# Patient Record
Sex: FEMALE | Race: ASIAN | NOT HISPANIC OR LATINO
[De-identification: names, ages, dates, MRNs, and addresses within clinical notes are randomized per-mention and may not be internally consistent; named-entity substitution may affect disease eponyms.]

---

## 2017-12-18 ENCOUNTER — RESULT REVIEW (OUTPATIENT)
Age: 47
End: 2017-12-18

## 2018-12-19 ENCOUNTER — RESULT REVIEW (OUTPATIENT)
Age: 48
End: 2018-12-19

## 2020-01-31 ENCOUNTER — RESULT REVIEW (OUTPATIENT)
Age: 50
End: 2020-01-31

## 2020-03-11 PROBLEM — Z00.00 ENCOUNTER FOR PREVENTIVE HEALTH EXAMINATION: Status: ACTIVE | Noted: 2020-03-11

## 2020-03-18 ENCOUNTER — APPOINTMENT (OUTPATIENT)
Dept: ORTHOPEDIC SURGERY | Facility: CLINIC | Age: 50
End: 2020-03-18

## 2020-06-02 ENCOUNTER — APPOINTMENT (OUTPATIENT)
Dept: ORTHOPEDIC SURGERY | Facility: CLINIC | Age: 50
End: 2020-06-02

## 2020-06-09 ENCOUNTER — APPOINTMENT (OUTPATIENT)
Dept: ORTHOPEDIC SURGERY | Facility: CLINIC | Age: 50
End: 2020-06-09
Payer: COMMERCIAL

## 2020-06-09 VITALS
BODY MASS INDEX: 28.47 KG/M2 | DIASTOLIC BLOOD PRESSURE: 97 MMHG | HEIGHT: 60 IN | TEMPERATURE: 98.4 F | SYSTOLIC BLOOD PRESSURE: 143 MMHG | WEIGHT: 145 LBS | OXYGEN SATURATION: 95 % | HEART RATE: 86 BPM

## 2020-06-09 DIAGNOSIS — M22.2X2 PATELLOFEMORAL DISORDERS, RIGHT KNEE: ICD-10-CM

## 2020-06-09 DIAGNOSIS — M22.2X1 PATELLOFEMORAL DISORDERS, RIGHT KNEE: ICD-10-CM

## 2020-06-09 DIAGNOSIS — M17.0 BILATERAL PRIMARY OSTEOARTHRITIS OF KNEE: ICD-10-CM

## 2020-06-09 PROCEDURE — 20611 DRAIN/INJ JOINT/BURSA W/US: CPT | Mod: LT

## 2020-06-09 PROCEDURE — 73564 X-RAY EXAM KNEE 4 OR MORE: CPT | Mod: RT

## 2020-06-09 PROCEDURE — 99204 OFFICE O/P NEW MOD 45 MIN: CPT | Mod: 25

## 2020-06-09 NOTE — PHYSICAL EXAM
[de-identified] : Physical Examination\par General: well nourished, in no acute distress, alert and oriented to person, place and time\par Psychiatric: normal mood and affect, no abnormal movements or speech patterns\par Eyes: vision intact - glasses\par Throat: no thyromegaly\par Lymph: no enlarged nodes, no lymphedema in extremity\par Respiratory: no wheezing, no shortness of breath with ambulation\par Cardiac: no cardiac leg swelling, 2+ peripheral pulses\par Neurology: normal gross sensation in extremities to light touch\par Abdomen: soft, non-tender, tympanic, no masses\par \par \par Limited right knee exam secondary to patient apprehension\par \par Musculoskeletal Examination\par Ambulation	+ antalgic gait, - assistive devices\par \par Knee			Right			Left\par General\par      Swelling/Deformity	normal			normal	\par      Skin			normal			normal\par      Erythema		-			-\par      Standing Alignment	neutral			neutral\par      Effusion		trace			trace\par Range of Motion\par      Hip			full painless ROM		full painless ROM\par      Knee Flexion		120			120\par      Knee Extension	0			0\par Patella\par      J Sign		-			-\par      Quad Medial/Lateral	1/1 1/1\par      Apprehension		-			-\par      Norman's		+			+\par      Grind Sign		+			+\par      Crepitus		+			+\par Palpation\par      Medial Joint Line	=			=\par      Medial Fem Condyle	-			-\par      Lateral Joint Line	-			-\par      Quad Tendon		-			-\par      Patella Tendon	-			-\par      Medial Patella		-			-\par      Lateral Patella 	-			-\par      Posterior Knee	+			+\par Ligamentous\par      Varus @ 0° / 30°	-/-			-/-\par      Valgus @ 0° / 30°	-/-			-/-\par      Lachman		-			-\par      Pivot Shift		-			-\par      Anterior Drawer	-			-\par      Posterior Drawer	-			-\par Meniscus\par      Hanna		=			-\par      Flexion Pinch		= post knee			-\par Strength Examination/Atrophy\par      Hip Flexors 		5+			5+\par      Quadriceps		5+			5+\par      Hamstring		5+			5+\par      Tibialis Anterior	5+			5+\par      Achilles/Soleus	5+			5+\par Sensation\par      Deep Peroneal	normal			normal\par      Superficial Peroneal 	normal			normal\par      Sural  		normal			normal\par      Posterior Tibial 	normal			normal\par      Saphneous 		normal			normal\par Pulses\par      DP			2+			2+\par  [de-identified] : 5 views of the affected bilateral knee (standing AP, flexing standing AP, 30degree flexed lateral, 0degree lateral, sunrise view)\par were ordered, obtained and evaluated by myself today and\par demonstrate:\par \par RIGHT\par There is trace flex knee medial asymmetric narrowing\par Trace osteophytic lipping\par Small suprapatellar effusion\par Trace patellofemoral joint space loss without evidence of tilt [or] subluxation on sunrise view\par Normal soft tissue density\par Otherwise normal osseous bone structure without fracture or dislocation\par \par LEFT\par There is trace flexed knee medial asymmetric narrowing\par Trace osteophytic lipping\par Trace suprapatellar effusion\par Trace patellofemoral joint space loss without evidence of tilt [or] subluxation on sunrise view\par Normal soft tissue density\par Otherwise normal osseous bone structure without fracture or dislocation

## 2020-06-09 NOTE — HISTORY OF PRESENT ILLNESS
[de-identified] : CC Bilateral knee\par \par HPI 48 yo female presents with gradual onset of over months of activity related pain in the entire Bilateral knee [without injury]. The pain is same, and rated a 5 out of 10, described as pain, [without radiation]. Nothing makes the pain better and walking makes the pain worse. The patient reports associated symptoms of swollen posterior knee. The patient - pain at night affecting sleep, and + similar pain previously.\par \par The patient has tried the following treatments:\par Activity modification	-\par Ice/Compression  	-\par Braces    		-\par Nsaids    		-\par Physical Therapy 	-\par Cortisone Injection	-\par Visco Injection		-\par Zilretta			-\par Arthroscopy		-\par \par Review of Systems is positive for the above musculoskeletal symptoms and is otherwise non-contributory for general, constitutional, psychiatric, neurologic, HEENT, cardiac, respiratory, gastrointestinal, reproductive, lymphatic, and dermatologic complaints.\par \par Consult by Dr Crump\par

## 2020-06-09 NOTE — DISCUSSION/SUMMARY
[de-identified] : Bilateral knee chondromalacia\par Right knee effusion\par Possible right knee internal derangement\par \par I discussed my findings on history, exam and radiology.\par \par I reviewed the anatomy and function of the periarticular muscles and tendons, patella, ligaments, menisci and cartilage of the knee using models, images and diagrams. Given the current findings for the patient, I recommend proceeding with non-operative management of the knee consisting of the following:\par \par Patient education about the knee motions causing pain and possibly injury for activity modification\par \par Avoid deep knee bends and squating to prevent exacerbating knee injury\par \par Ice or warm compress\par \par Physical therapy prescription with VMO/Hip Abductor/Lumbar Core strengthening, ROM stretching, mobilization, modalities, HEP\par \par The patient was prescribed Diclofenac PO non-steroidal anti-inflammatory medication. 50mg tablets twice daily to be taken for at least 1-2 weeks in a row and then PRN afterwards. Risks and benefits were discussed and include but not limited to renal damage and GI ulceration and bleeding.  They were advised to take with food to limit stomach upset as well as warned to stop the medication if worsening gastric pain or dizziness or other side effects. Also to immediately stop the medication and seek appropriate medical attention if any severe stomach ache, gastritis, black/red vomit, black/red stools or any other medical concern.\par \par Patient is not amenable to any surgical management\par We'll continue with maximal medical management\par \par Procedure Note:\par \par Verbal consent was obtained for an arthrocentesis of the RIGHT knee, after the risks and benefits were discussed with the patient. Potential adverse effects were discussed including but not limited to bleeding, skin/joint infection, local skin reactions including bleaching, bruising, stiffness, soreness, vasovagal episodes, transient hyperglycemia, avascular necrosis, pseudo-septic type reactions, post injection joint pain, allergic reaction to product or anesthetic and other rare but potential adverse effects along with benefits including decreased pain and improved stability prior to obtaining verbal informed consent. It was also discussed that for some patients the treatment is ineffective and there are no guarantees that the patient will experience improvement as the result of the injection. In rare occasions the injection can cause worsening of pain.\par \par The use of a Sonosite 15-6 MHz linear transducer with live ultrasound guidance of the knee was necessary given the patient's BMI and local body habitus overlying and obscuring the accurate identification of normal body bony anatomy used to identify the injection site and the depth of soft tissue envelope necessitating a longer than normal needle to reach the joint space, and to confirm the location of the needle tip and intra-articular delivery of the medication. Without the use of live ultrasound guidance the injection would have been more difficult and place the patient's neurovascular structures at risk from the longer needle needed to traverse the soft tissue envelope.\par \par A 6 inch bolster was placed underneath the knee to place the RIGHT knee in a slightly flexed position. The superolateral injection site was identified using the ultrasound probe to identify the suprapatellar space and superior boarder of the patella first longitudinally and then transversely. The injection site was marked and prepped with a ChloraPrep swab and anesthetized with ethylchloride skin anesthesia. Using sterile technique a 18g 3 1/2 in spinal needle was passed through the injection site towards the suprapatellar space under live ultrasound guidance and noted to penetrate the joint capsule. 15 cc clear straw colored fluid was aspirated under live ultrasound visualization with resolution of joint effusion. 3 cc total of 1.5cc 1% lidocaine without epinephrine, 1.5cc 0.25% Marcaine without epinephrine was injected without resistance under live ultrasound visualization and noted to flow into the suprapatellar joint space. The injection site was sterilely dressed, there was minimal blood loss. The patient tolerated this procedure without any complications done by myself. Images were recorded and saved.\par \par Procedure Note:\par \par Verbal consent was obtained for an arthrocentesis and intra-articular corticosteroid injection of the LEFT knee, after the risks and benefits were discussed with the patient. Potential adverse effects were discussed including but not limited to bleeding, skin/joint infection, local skin reactions including bleaching, bruising, stiffness, soreness, vasovagal episodes, transient hyperglycemia, avascular necrosis, pseudo-septic type reactions, post injection joint pain, allergic reaction to product or anesthetic and other rare but potential adverse effects along with benefits including decreased pain and improved stability prior to obtaining verbal informed consent. It was also discussed that for some patients the treatment is ineffective and there are no guarantees that the patient will experience improvement as the result of the injection. In rare occasions the injection can cause worsening of pain.\par \par The use of a Sonosite 15-6 MHz linear transducer with live ultrasound guidance of the knee was necessary given the patient's BMI and local body habitus overlying and obscuring the accurate identification of normal body bony anatomy used to identify the injection site and the depth of soft tissue envelope necessitating a longer than normal needle to reach the joint space, and to confirm the location of the needle tip and intra-articular delivery of the medication. Without the use of live ultrasound guidance the injection would have been more difficult and place the patient's neurovascular structures at risk from the longer needle needed to traverse the soft tissue envelope.\par \par A 6 inch bolster was placed underneath the knee to place the knee in a slightly flexed position. The superolateral injection site was identified using the ultrasound probe to identify the suprapatellar space and superior boarder of the patella first longitudinally and then transversely. The injection site was marked and prepped with a ChloraPrep swab and anesthetized with ethylchloride skin anesthesia. Using sterile technique a 20g 3 1/2 in spinal needle with 3 cc total of 1cc 1% lidocaine without epinephrine, 1cc 0.25% Marcaine without epinephrine and 1cc of 40mg/mL Kenalog was passed through the injection site towards the suprapatellar space under live ultrasound guidance and noted to penetrate the joint capsule. The medication was injected without resistance under live ultrasound visualization and noted to flow into the suprapatellar joint space. The injection site was sterilely dressed, there was minimal blood loss. The patient tolerated this procedure without any complications done by myself. Images were recorded and saved.\par \par The patient has been advised that if they notice any worsening of symptoms or any problems to contact me and seek care from a qualified medical professional. The patient was instructed to ice the knee and take NSAID medication on an as needed basis if the patient feels discomfort.\par \par The patient has been advised that if they notice any worsening of symptoms or any problems to contact me and seek care from a qualified medical professional. The patient was instructed to ice the knee and take NSAID medication on an as needed basis if the patient feels discomfort.\par \par \par \par \par \par The patient verifies their understanding the the visit, diagnosis and plan. They agree with the treatment plan and will contact the office with any questions or problems.\par \par FU after PT completion if unimproved\par

## 2020-06-14 ENCOUNTER — EMERGENCY (EMERGENCY)
Facility: HOSPITAL | Age: 50
LOS: 1 days | Discharge: HOME | End: 2020-06-14
Attending: EMERGENCY MEDICINE | Admitting: EMERGENCY MEDICINE
Payer: COMMERCIAL

## 2020-06-14 VITALS
HEART RATE: 68 BPM | SYSTOLIC BLOOD PRESSURE: 120 MMHG | DIASTOLIC BLOOD PRESSURE: 68 MMHG | OXYGEN SATURATION: 100 % | RESPIRATION RATE: 16 BRPM | TEMPERATURE: 98 F

## 2020-06-14 VITALS
SYSTOLIC BLOOD PRESSURE: 163 MMHG | RESPIRATION RATE: 18 BRPM | OXYGEN SATURATION: 100 % | DIASTOLIC BLOOD PRESSURE: 89 MMHG | HEART RATE: 74 BPM | TEMPERATURE: 98 F

## 2020-06-14 LAB
ALBUMIN SERPL ELPH-MCNC: 4.6 G/DL — SIGNIFICANT CHANGE UP (ref 3.3–5)
ALP SERPL-CCNC: 95 U/L — SIGNIFICANT CHANGE UP (ref 40–120)
ALT FLD-CCNC: 8 U/L — SIGNIFICANT CHANGE UP (ref 4–33)
ANION GAP SERPL CALC-SCNC: 14 MMO/L — SIGNIFICANT CHANGE UP (ref 7–14)
APPEARANCE UR: CLEAR — SIGNIFICANT CHANGE UP
APTT BLD: 27.1 SEC — LOW (ref 27.5–36.3)
AST SERPL-CCNC: 15 U/L — SIGNIFICANT CHANGE UP (ref 4–32)
BACTERIA # UR AUTO: NEGATIVE — SIGNIFICANT CHANGE UP
BASOPHILS # BLD AUTO: 0.04 K/UL — SIGNIFICANT CHANGE UP (ref 0–0.2)
BASOPHILS NFR BLD AUTO: 0.3 % — SIGNIFICANT CHANGE UP (ref 0–2)
BILIRUB SERPL-MCNC: 0.3 MG/DL — SIGNIFICANT CHANGE UP (ref 0.2–1.2)
BILIRUB UR-MCNC: NEGATIVE — SIGNIFICANT CHANGE UP
BLD GP AB SCN SERPL QL: NEGATIVE — SIGNIFICANT CHANGE UP
BLOOD UR QL VISUAL: HIGH
BUN SERPL-MCNC: 26 MG/DL — HIGH (ref 7–23)
CALCIUM SERPL-MCNC: 9.6 MG/DL — SIGNIFICANT CHANGE UP (ref 8.4–10.5)
CHLORIDE SERPL-SCNC: 100 MMOL/L — SIGNIFICANT CHANGE UP (ref 98–107)
CO2 SERPL-SCNC: 28 MMOL/L — SIGNIFICANT CHANGE UP (ref 22–31)
COLOR SPEC: SIGNIFICANT CHANGE UP
CREAT SERPL-MCNC: 0.96 MG/DL — SIGNIFICANT CHANGE UP (ref 0.5–1.3)
EOSINOPHIL # BLD AUTO: 0.08 K/UL — SIGNIFICANT CHANGE UP (ref 0–0.5)
EOSINOPHIL NFR BLD AUTO: 0.6 % — SIGNIFICANT CHANGE UP (ref 0–6)
GLUCOSE SERPL-MCNC: 108 MG/DL — HIGH (ref 70–99)
GLUCOSE UR-MCNC: NEGATIVE — SIGNIFICANT CHANGE UP
HCT VFR BLD CALC: 42.3 % — SIGNIFICANT CHANGE UP (ref 34.5–45)
HGB BLD-MCNC: 13.3 G/DL — SIGNIFICANT CHANGE UP (ref 11.5–15.5)
HYALINE CASTS # UR AUTO: NEGATIVE — SIGNIFICANT CHANGE UP
IMM GRANULOCYTES NFR BLD AUTO: 0.6 % — SIGNIFICANT CHANGE UP (ref 0–1.5)
INR BLD: 1.03 — SIGNIFICANT CHANGE UP (ref 0.88–1.17)
KETONES UR-MCNC: NEGATIVE — SIGNIFICANT CHANGE UP
LEUKOCYTE ESTERASE UR-ACNC: NEGATIVE — SIGNIFICANT CHANGE UP
LYMPHOCYTES # BLD AUTO: 1.86 K/UL — SIGNIFICANT CHANGE UP (ref 1–3.3)
LYMPHOCYTES # BLD AUTO: 12.9 % — LOW (ref 13–44)
MCHC RBC-ENTMCNC: 27.6 PG — SIGNIFICANT CHANGE UP (ref 27–34)
MCHC RBC-ENTMCNC: 31.4 % — LOW (ref 32–36)
MCV RBC AUTO: 87.8 FL — SIGNIFICANT CHANGE UP (ref 80–100)
MONOCYTES # BLD AUTO: 0.67 K/UL — SIGNIFICANT CHANGE UP (ref 0–0.9)
MONOCYTES NFR BLD AUTO: 4.7 % — SIGNIFICANT CHANGE UP (ref 2–14)
NEUTROPHILS # BLD AUTO: 11.67 K/UL — HIGH (ref 1.8–7.4)
NEUTROPHILS NFR BLD AUTO: 80.9 % — HIGH (ref 43–77)
NITRITE UR-MCNC: NEGATIVE — SIGNIFICANT CHANGE UP
NRBC # FLD: 0 K/UL — SIGNIFICANT CHANGE UP (ref 0–0)
PH UR: 6 — SIGNIFICANT CHANGE UP (ref 5–8)
PLATELET # BLD AUTO: 325 K/UL — SIGNIFICANT CHANGE UP (ref 150–400)
PMV BLD: 9.8 FL — SIGNIFICANT CHANGE UP (ref 7–13)
POTASSIUM SERPL-MCNC: 3.6 MMOL/L — SIGNIFICANT CHANGE UP (ref 3.5–5.3)
POTASSIUM SERPL-SCNC: 3.6 MMOL/L — SIGNIFICANT CHANGE UP (ref 3.5–5.3)
PROT SERPL-MCNC: 8.4 G/DL — HIGH (ref 6–8.3)
PROT UR-MCNC: 20 — SIGNIFICANT CHANGE UP
PROTHROM AB SERPL-ACNC: 11.7 SEC — SIGNIFICANT CHANGE UP (ref 9.8–13.1)
RBC # BLD: 4.82 M/UL — SIGNIFICANT CHANGE UP (ref 3.8–5.2)
RBC # FLD: 13.3 % — SIGNIFICANT CHANGE UP (ref 10.3–14.5)
RBC CASTS # UR COMP ASSIST: HIGH (ref 0–?)
RH IG SCN BLD-IMP: POSITIVE — SIGNIFICANT CHANGE UP
SODIUM SERPL-SCNC: 142 MMOL/L — SIGNIFICANT CHANGE UP (ref 135–145)
SP GR SPEC: 1.02 — SIGNIFICANT CHANGE UP (ref 1–1.04)
SQUAMOUS # UR AUTO: SIGNIFICANT CHANGE UP
UROBILINOGEN FLD QL: NORMAL — SIGNIFICANT CHANGE UP
WBC # BLD: 14.4 K/UL — HIGH (ref 3.8–10.5)
WBC # FLD AUTO: 14.4 K/UL — HIGH (ref 3.8–10.5)
WBC UR QL: SIGNIFICANT CHANGE UP (ref 0–?)

## 2020-06-14 PROCEDURE — 74176 CT ABD & PELVIS W/O CONTRAST: CPT | Mod: 26

## 2020-06-14 PROCEDURE — 99284 EMERGENCY DEPT VISIT MOD MDM: CPT

## 2020-06-14 NOTE — ED PROVIDER NOTE - PATIENT PORTAL LINK FT
You can access the FollowMyHealth Patient Portal offered by [facility] by registering at the following website: [email or web address]. By joining [facility]’s FollowMyHealth portal, you will also be able to view your health information using other applications (apps) compatible with our system.

## 2020-06-14 NOTE — ED PROVIDER NOTE - NSFOLLOWUPINSTRUCTIONS_ED_ALL_ED_FT
Please follow up with your primary care doctor in 1-2 days.  See referral attached for Urology and General Surgery referral.  If you have any new, worsened or concerning complaints, please return to the emergency department immediately.

## 2020-06-14 NOTE — ED PROVIDER NOTE - CLINICAL SUMMARY MEDICAL DECISION MAKING FREE TEXT BOX
Pt is a 48 y/o F PMHx HTN, borderline diabetes p/w abdominal pain today. -- possible renal colic, not clinically concerning for appendicitis, low suspicion for emergent pelvic pathology given normal pelvic exam at this time -- labs, CT abd and pelvis

## 2020-06-14 NOTE — ED PROVIDER NOTE - PROGRESS NOTE DETAILS
MARTHA Norris: pt endorsed from MARTHA Serra. Pt reassessed, states she feels better. Labs reviewed. UA shows no evidence of UTI. CT shows no acute intraabdominal findings - "Tiny non-obstructing intrarenal calculi. No hydronephrosis. Small fat-containing right inguinal hernia." On reassessment, abdomen soft nontender. Pt ate and tolerated oral intake well. Pt is clinically stable for discharge home. Urology follow up for intrarenal calculi and Gen Surg follow up for small right inguinal hernia. Return precautions. c/d/w Dr. Snell

## 2020-06-14 NOTE — ED ADULT TRIAGE NOTE - CHIEF COMPLAINT QUOTE
Patient has c/o right sided abdominal pain that started today. Pt had a surgery last Tuesday on both knees to drain fluid and was given diclofenac and she states she might be having side effects from the meds or something she ate.

## 2020-06-14 NOTE — ED PROVIDER NOTE - NONTENDER LOCATION
right costovertebral angle/right upper quadrant/right lower quadrant/left upper quadrant/left lower quadrant/umbilical/suprapubic/left costovertebral angle/periumbilical

## 2020-06-14 NOTE — ED ADULT NURSE NOTE - OBJECTIVE STATEMENT
Pt received to intake AAO x 3 and ambulatory c/o RLQ pain that began suddenly today at 4PM. Pt denies fever or dysuria and reports one episode of vomtiting. Labs sent and 18g placed to the left AC, pt awaiting CT scan and in NAD.

## 2020-06-14 NOTE — ED PROVIDER NOTE - ATTENDING CONTRIBUTION TO CARE
Attending Statement: I have reviewed and agree with all pertinent clinical information, including history and physical exam and agree with treatment plan of the PA, except as noted.  50yo F hx of HTN, borderline DM, with abdominal pain today. Described a pain on/off located on the right sided abdomen/flank non radiating. Associated with nausea and one episode of NBNB vomit. no diarrhea. no fever/chills. no dysuria no hematuria no frequency or urgency. no trauma.   Vital signs noted. mmm. nontoxic, no distress. No resp distress. able to speak in full and clear sentences. no wheeze, rales or stridor. soft obese abdomen, mild tenderness on the right midlateral aspect. no cvat. neg parisi pelvic dw w  MARTHA Serra.   plan labs, urine, ct [place], IVF, anti emetic, pain med, re assess

## 2020-06-14 NOTE — ED PROVIDER NOTE - OBJECTIVE STATEMENT
Pt is a 48 y/o F PMHx HTN, borderline diabetes p/w abdominal pain today.  Pt states at 4 PM pt developed sudden onset moderate nonradiating right sided abdominal pain associated with nausea and nonbloody vomiting.  Pt states pain began to improve but then had a recurrent episode of same pain while showering.  Presently pain is mild.  Pt notes she was concerned that pain due to recently starting Diclofenac for bilateral knee pain.  Pt denies any fevers, chills, chest pain, SOB, dysuria, cloudy urine, hematuria, vaginal bleeding, vaginal discharge, flank pain or any other specific complaints.  Pt also notes she is scheduled to have a pelvic sono tomorrow to evaluate for fibroids.

## 2020-06-15 LAB
CULTURE RESULTS: SIGNIFICANT CHANGE UP
SPECIMEN SOURCE: SIGNIFICANT CHANGE UP

## 2020-06-22 NOTE — ED POST DISCHARGE NOTE - RESULT SUMMARY
MARTHA Yanez: Pt called back requesting urine results, uc contaminated, pt w/ punctate renal stones janie. Recommended repeating UC w/ PMD.

## 2020-06-30 PROBLEM — I10 ESSENTIAL (PRIMARY) HYPERTENSION: Chronic | Status: ACTIVE | Noted: 2020-06-14

## 2020-06-30 PROBLEM — R73.03 PREDIABETES: Chronic | Status: ACTIVE | Noted: 2020-06-14

## 2020-07-21 ENCOUNTER — APPOINTMENT (OUTPATIENT)
Dept: UROLOGY | Facility: CLINIC | Age: 50
End: 2020-07-21
Payer: COMMERCIAL

## 2020-07-21 VITALS
SYSTOLIC BLOOD PRESSURE: 136 MMHG | RESPIRATION RATE: 16 BRPM | TEMPERATURE: 98.3 F | DIASTOLIC BLOOD PRESSURE: 89 MMHG | HEIGHT: 60 IN | BODY MASS INDEX: 28.47 KG/M2 | HEART RATE: 81 BPM | WEIGHT: 145 LBS

## 2020-07-21 VITALS — TEMPERATURE: 98.3 F

## 2020-07-21 DIAGNOSIS — Z78.9 OTHER SPECIFIED HEALTH STATUS: ICD-10-CM

## 2020-07-21 PROCEDURE — 99203 OFFICE O/P NEW LOW 30 MIN: CPT

## 2020-07-21 RX ORDER — DICLOFENAC SODIUM 50 MG/1
50 TABLET, DELAYED RELEASE ORAL
Qty: 60 | Refills: 1 | Status: COMPLETED | COMMUNITY
Start: 2020-06-09 | End: 2020-07-21

## 2020-07-21 NOTE — PHYSICAL EXAM
[General Appearance - Well Developed] : well developed [General Appearance - Well Nourished] : well nourished [Normal Appearance] : normal appearance [Well Groomed] : well groomed [General Appearance - In No Acute Distress] : no acute distress [Edema] : no peripheral edema [Respiration, Rhythm And Depth] : normal respiratory rhythm and effort [Exaggerated Use Of Accessory Muscles For Inspiration] : no accessory muscle use [Abdomen Tenderness] : non-tender [Abdomen Soft] : soft [Costovertebral Angle Tenderness] : no ~M costovertebral angle tenderness [Urinary Bladder Findings] : the bladder was normal on palpation [Normal Station and Gait] : the gait and station were normal for the patient's age [Skin Color & Pigmentation] : normal skin color and pigmentation [Skin Turgor] : supple [] : no rash [No Focal Deficits] : no focal deficits [Oriented To Time, Place, And Person] : oriented to person, place, and time [Affect] : the affect was normal [Mood] : the mood was normal [Not Anxious] : not anxious [No Palpable Adenopathy] : no palpable adenopathy

## 2020-07-22 LAB
APPEARANCE: CLEAR
BACTERIA: NEGATIVE
BILIRUBIN URINE: NEGATIVE
BLOOD URINE: NEGATIVE
COLOR: NORMAL
GLUCOSE QUALITATIVE U: NEGATIVE
HYALINE CASTS: 1 /LPF
KETONES URINE: NEGATIVE
LEUKOCYTE ESTERASE URINE: NEGATIVE
MICROSCOPIC-UA: NORMAL
NITRITE URINE: NEGATIVE
PH URINE: 7
PROTEIN URINE: NORMAL
RED BLOOD CELLS URINE: 2 /HPF
SPECIFIC GRAVITY URINE: 1.02
SQUAMOUS EPITHELIAL CELLS: 2 /HPF
UROBILINOGEN URINE: NORMAL
WHITE BLOOD CELLS URINE: 1 /HPF

## 2020-07-22 NOTE — HISTORY OF PRESENT ILLNESS
[FreeTextEntry1] : Patient is a 49 year old female. On June 14th patient had severe abdominal pain and vomiting, went to ED.  CT scan done shows tiny bilateral punctuate renal stones and right ovarian vein phlebolith noted just posterior to the proximal right ureter. \par Patient denies any gross hematuria. Denies any history of kidney stones. Denies any flank pain, denies any fever. Denies any dysuria.  She does report some urinary frequency since this episode has began.

## 2020-07-22 NOTE — LETTER BODY
[Dear  ___] : Dear  [unfilled], [FreeTextEntry1] : I had the pleasure of seeing your patient, LEON BECERRIL, in the office today.  \par \par Please see my office note below.\par \par Thank you for allowing me to participate in his care and please do not hesitate to contact me with any questions or concerns regarding her care.\par \par Sincerely,\par \par Troy Leija MD\par Chief of Urology, [facility]\par  of Urology\par [address] [place] [address]\[place] [address]\par PH:      [phone]\par Email:  [email or web address]\par \par

## 2020-07-22 NOTE — ASSESSMENT
[FreeTextEntry1] : Reviewed CT scan with patient. CT scan done shows tiny bilateral punctuate non-obstructing renal stones, no hydronephrosis  and right ovarian vein phlebolith noted just posterior to the proximal right ureter.\par Urinalysis sent today.   If blood noted in urine will follow up with CT Urogram . If no blood in urine will follow up with ultrasound of the kidney in 6 months. Will call patient with results and discuss plan.  Patient will also be following up with GYN this week

## 2020-07-22 NOTE — REVIEW OF SYSTEMS
[Abdominal Pain] : abdominal pain [Date of last menstrual period ____] : date of last menstrual period: [unfilled] [Wake up at night to urinate  How many times?  ___] : wakes up to urinate [unfilled] times during the night [Slow urine stream] : slow urine stream [Leakage of urine with straining, coughing, laughing] : leakage of urine with straining, coughing, laughing [Joint Pain] : joint pain [Joint Swelling] : joint swelling [Dizziness] : dizziness [Limb Swelling] : limb swelling [Muscle Weakness] : muscle weakness [Negative] : Endocrine [see HPI] : see HPI [FreeTextEntry2] : high blood pressure

## 2020-07-23 LAB — BACTERIA UR CULT: NORMAL

## 2021-02-16 ENCOUNTER — APPOINTMENT (OUTPATIENT)
Dept: UROLOGY | Facility: CLINIC | Age: 51
End: 2021-02-16

## 2021-02-18 ENCOUNTER — APPOINTMENT (OUTPATIENT)
Dept: UROLOGY | Facility: CLINIC | Age: 51
End: 2021-02-18

## 2021-04-15 ENCOUNTER — APPOINTMENT (OUTPATIENT)
Dept: UROLOGY | Facility: CLINIC | Age: 51
End: 2021-04-15
Payer: COMMERCIAL

## 2021-04-15 ENCOUNTER — OUTPATIENT (OUTPATIENT)
Dept: OUTPATIENT SERVICES | Facility: HOSPITAL | Age: 51
LOS: 1 days | End: 2021-04-15
Payer: COMMERCIAL

## 2021-04-15 DIAGNOSIS — R35.0 FREQUENCY OF MICTURITION: ICD-10-CM

## 2021-04-15 DIAGNOSIS — N20.0 CALCULUS OF KIDNEY: ICD-10-CM

## 2021-04-15 PROCEDURE — 99213 OFFICE O/P EST LOW 20 MIN: CPT | Mod: 25

## 2021-04-15 PROCEDURE — 99072 ADDL SUPL MATRL&STAF TM PHE: CPT

## 2021-04-15 PROCEDURE — 76775 US EXAM ABDO BACK WALL LIM: CPT

## 2021-04-15 PROCEDURE — 76775 US EXAM ABDO BACK WALL LIM: CPT | Mod: 26

## 2021-04-21 NOTE — HISTORY OF PRESENT ILLNESS
[FreeTextEntry1] : Here for follow-up.  Last visit in summer 2020 at which time she developed a obstructing stone causing flank pain.  She passed a stone spontaneously.  She returns today for an office renal ultrasound.\par \par Since her last visit no gross hematuria, dysuria.  No evidence of UTI.  She denies flank pain or other associated symptoms.  She has not passed any other stones since her last visit.\par \par Urinary function remains normal.

## 2021-04-21 NOTE — ASSESSMENT
[FreeTextEntry1] : Office renal stone performed today.  Images reviewed.  No remaining stone seen within the kidney.  No hydronephrosis or other abnormality.  Stable renal cyst.\par \par Discussed all prevention strategies including adequate fluid intake, low-sodium diet.\par \par No further follow-up necessary unless patient develops recurrent symptoms.

## 2021-05-03 ENCOUNTER — RESULT REVIEW (OUTPATIENT)
Age: 51
End: 2021-05-03

## 2021-08-16 ENCOUNTER — RESULT REVIEW (OUTPATIENT)
Age: 51
End: 2021-08-16

## 2021-12-02 ENCOUNTER — APPOINTMENT (OUTPATIENT)
Dept: SURGERY | Facility: CLINIC | Age: 51
End: 2021-12-02
Payer: COMMERCIAL

## 2021-12-02 VITALS
WEIGHT: 158 LBS | SYSTOLIC BLOOD PRESSURE: 154 MMHG | HEIGHT: 60 IN | DIASTOLIC BLOOD PRESSURE: 98 MMHG | BODY MASS INDEX: 31.02 KG/M2 | HEART RATE: 93 BPM

## 2021-12-02 VITALS — TEMPERATURE: 97.1 F

## 2021-12-02 PROCEDURE — 99203 OFFICE O/P NEW LOW 30 MIN: CPT

## 2021-12-02 NOTE — DATA REVIEWED
Class III - visualization of the soft palate and the base of the uvula [FreeTextEntry1] : Exam requested by:\par AISHWARYA BANGURA MD\par [address] [facility] [place]par [facility] [address]\[facility]\par SITE PHONE: [phone]\par Patient: LEON BECERRIL\par YOB: 1970\par Phone: [phone]\par MRN: [id number] Acc: [id number]\par Date of Exam: 10-\par  \par EXAM: DIGITAL BILATERAL SCREENING MAMMOGRAM WITH CAD AND TOMOSYNTHESIS\par \par HISTORY: The patient is 50 years old and is seen for a screening mammogram. Family history of breast cancer: None.\par \par CLINICAL BREAST EXAMINATION: The patient reports that her last clinical breast exam was within the past year. \par \par TECHNIQUE: The following views were obtained digitally: bilateral craniocaudal, bilateral mediolateral oblique. Low-dose full-field digital breast tomosynthesis examination was performed with tomosynthesis acquisitions and synthesized 2D reconstructed mammogram. Computer-aided detection (CAD) was utilized. \par \par COMPARISON: The present examination has been compared to prior breast imaging studies dating back to 2014.\par \par FINDINGS:\par BREAST COMPOSITION: The breasts are heterogeneously dense, which may obscure small masses.\par Left Breast:\par There is a focal asymmetry seen at the mid depth left lower inner quadrant approximately 7 cm from the nipple.\par No evidence of suspicious microcalcifications. There are benign-appearing calcifications and vascular calcifications present.\par Right Breast:\par No evidence of suspicious masses, microcalcifications, or architectural distortion.\par \par IMPRESSION:\par Left Breast: Focal asymmetry seen at the mid depth left lower inner quadrant approximately 7 cm from the nipple requires further evaluation with diagnostic mammogram and ultrasound.\par \par Right Breast: No mammographic evidence of malignancy. \par \par FOLLOW-UP: Additional imaging.\par Diagnostic left breast mammogram and ultrasound. \par \par ASSESSMENT: BI-RADS Category 0:  Incomplete. Need additional imaging evaluation.\par \par This examination should not preclude the clinical evaluation of a suspicious palpable abnormality. \par The false-negative rate of mammography is approximately 10%\par \par As per the FDA requirements, a layman's letter has been generated and sent to your patient stating the results and recommendations of this breast imaging study. We have entered your patient into our reminder system and will notify them when they are due for their next breast imaging exam. \par \par Thank you for the opportunity to participate in the care of this patient.  \par  \par KERRIE WALTON MD  - Electronically Signed: 10- 11:40 AM \par Physician to Physician Direct Line is: \par \par ______________________________________\par \par Exam requested by:\par AISHWARYA BANGURA MD\par [address] [facility]par [facility] [address]\par SITE PERFORMED: [facility]\par SITE PHONE: [phone]\par Patient: LEON BECERRIL\par YOB: 1970\par Phone: [phone]\par MRN: [id number] Acc: [id number]\par Date of Exam: 10-\par  \par EXAM: DIGITAL UNILATERAL LEFT DIAGNOSTIC CALLBACK MAMMOGRAM AND TOMOSYNTHESIS AND BREAST ULTRASOUND\par \par HISTORY: The patient is seen for evaluation of focal asymmetry left breast \par Age: 50 years old. \par \par COMPARISON: The present examination has been compared to prior breast imaging studies dating back to October 27, 2015\par \par MAMMOGRAM:\par \par TECHNIQUE: Full-field digital mammography of the left breast was obtained. Additional imaging is composed of spot compression view in CC and MLO projection Low-dose full-field digital breast tomosynthesis examination was performed with tomosynthesis acquisitions and synthesized 2D reconstructed mammogram. Computer-aided detection (CAD) was utilized. \par \par FINDINGS:\par BREAST COMPOSITION: The breasts are heterogeneously dense, which may obscure small masses. \par \par There is a 6 mm nodular asymmetry in the inner lower left breast.\par \par BREAST ULTRASOUND: \par \par TECHNIQUE: Targeted left breast ultrasound was performed.  \par \par FINDINGS: \par \par \par In the left breast at 7:00 7 cm from the nipple corresponding to the finding on mammography is a 7 x 4 mm hypoechoic nodule which suggests a complicated cyst.\par \par IMPRESSION:\par Persistent small nodular asymmetry inner lower left breast with corresponding 7 x 4 mm hypoechoic nodule at 7:00 clock left breast 7 cm from the nipple suggesting a complicated cyst  \par \par FOLLOW-UP: Follow-up imaging in 6 months. \par Short-term follow-up targeted left breast ultrasound recommended to confirm stability \par \par ASSESSMENT: BI-RADS Category 3:  Probably benign.\par \par This examination should not preclude the clinical evaluation of a suspicious palpable abnormality. \par \par As per the FDA requirements, a layman's letter has been generated and sent to your patient stating the results and recommendations of this breast imaging study. We have entered your patient into our reminder system and will notify them when they are due for their next breast imaging exam. \par \par Thank you for the opportunity to participate in the care of this patient.  \par  \par Anthony Sow MD  - Electronically Signed: 10- 1:24 PM \par Physician to Physician Direct Line is: [phone]\par

## 2021-12-02 NOTE — HISTORY OF PRESENT ILLNESS
[de-identified] : Bilateral Screening mammogram on 10/08/2021 that showed Left Breast: Focal asymmetry seen at the mid depth left lower inner quadrant approximately 7 cm from the nipple. Diagnostic mammo and sonogram of left breast  c/w Persistent small nodular asymmetry inner lower left breast with corresponding 7 x 4 mm hypoechoic nodule at 7:00 clock left breast 7 cm from the nipple suggesting a complicated cyst.  BI-RADS Category 3
Pt improved, atypical pain, bp improved, will dc to follow up with pmd and cardio. Precautions reviewed.

## 2021-12-02 NOTE — CONSULT LETTER
[Dear  ___] : Dear  [unfilled], [Consult Closing:] : Thank you very much for allowing me to participate in the care of this patient.  If you have any questions, please do not hesitate to contact me. [Sincerely,] : Sincerely, [FreeTextEntry3] : Sam Ballard MD\par

## 2021-12-02 NOTE — PLAN
[FreeTextEntry1] : Presently no surgical intervention needed\par Recommend mammo and breast US in 6 months

## 2022-04-06 ENCOUNTER — OUTPATIENT (OUTPATIENT)
Dept: OUTPATIENT SERVICES | Facility: HOSPITAL | Age: 52
LOS: 1 days | End: 2022-04-06
Payer: COMMERCIAL

## 2022-04-06 ENCOUNTER — APPOINTMENT (OUTPATIENT)
Dept: UROLOGY | Facility: CLINIC | Age: 52
End: 2022-04-06
Payer: COMMERCIAL

## 2022-04-06 DIAGNOSIS — R35.0 FREQUENCY OF MICTURITION: ICD-10-CM

## 2022-04-06 PROCEDURE — 76775 US EXAM ABDO BACK WALL LIM: CPT | Mod: 26

## 2022-04-06 PROCEDURE — 99213 OFFICE O/P EST LOW 20 MIN: CPT | Mod: 25

## 2022-04-06 PROCEDURE — 76775 US EXAM ABDO BACK WALL LIM: CPT

## 2022-04-06 NOTE — HISTORY OF PRESENT ILLNESS
[FreeTextEntry1] : Here for 1 year follow-up.  Has a previous history of ureteral stone causing obstruction.  Since her last visit, she denies gross hematuria, abdominal pain, flank pain.  No significant changes in her urinary function.  She has had no documented urinary tract infections.  Remainder of her medical history is otherwise unchanged.\par \par Today she underwent an office renal ultrasound.  The images were reviewed.  There is a small simple cyst.  There is no evidence of hydronephrosis and no evidence of residual stones within the kidney.

## 2022-04-06 NOTE — ASSESSMENT
[FreeTextEntry1] : I reviewed the ultrasound results with her.\par I again reviewed the dietary stone prevention strategies including fluid intake, low-sodium diet, low to moderate protein intake.\par No other intervention required at this time.  Follow-up as needed in the office.

## 2022-04-07 ENCOUNTER — TRANSCRIPTION ENCOUNTER (OUTPATIENT)
Age: 52
End: 2022-04-07

## 2022-04-08 DIAGNOSIS — N20.0 CALCULUS OF KIDNEY: ICD-10-CM

## 2022-04-18 ENCOUNTER — APPOINTMENT (OUTPATIENT)
Dept: SURGERY | Facility: CLINIC | Age: 52
End: 2022-04-18
Payer: COMMERCIAL

## 2022-04-18 VITALS
HEIGHT: 60 IN | SYSTOLIC BLOOD PRESSURE: 155 MMHG | DIASTOLIC BLOOD PRESSURE: 102 MMHG | WEIGHT: 155 LBS | HEART RATE: 79 BPM | BODY MASS INDEX: 30.43 KG/M2

## 2022-04-18 VITALS — TEMPERATURE: 97.1 F

## 2022-04-18 PROCEDURE — 99213 OFFICE O/P EST LOW 20 MIN: CPT

## 2022-04-18 NOTE — PLAN
[FreeTextEntry1] : SBE and to f/u for any further problem\par US breast cat 3 finding. Recommend repeat test in 6 months

## 2022-04-18 NOTE — HISTORY OF PRESENT ILLNESS
[de-identified] : She is here for breast exam. Sometimes she feels itchy on the left breast. No discharge. Does not feel any mass

## 2022-08-23 ENCOUNTER — APPOINTMENT (OUTPATIENT)
Dept: ORTHOPEDIC SURGERY | Facility: CLINIC | Age: 52
End: 2022-08-23

## 2022-08-23 VITALS — BODY MASS INDEX: 30.43 KG/M2 | WEIGHT: 155 LBS | HEIGHT: 60 IN

## 2022-08-23 PROCEDURE — 72040 X-RAY EXAM NECK SPINE 2-3 VW: CPT

## 2022-08-23 PROCEDURE — 99214 OFFICE O/P EST MOD 30 MIN: CPT

## 2022-08-23 PROCEDURE — 99204 OFFICE O/P NEW MOD 45 MIN: CPT

## 2022-08-23 RX ORDER — MELOXICAM 15 MG/1
15 TABLET ORAL
Qty: 30 | Refills: 1 | Status: ACTIVE | COMMUNITY
Start: 2022-08-23 | End: 1900-01-01

## 2022-09-16 ENCOUNTER — APPOINTMENT (OUTPATIENT)
Dept: ORTHOPEDIC SURGERY | Facility: CLINIC | Age: 52
End: 2022-09-16

## 2022-09-16 VITALS
HEIGHT: 60 IN | DIASTOLIC BLOOD PRESSURE: 100 MMHG | SYSTOLIC BLOOD PRESSURE: 155 MMHG | BODY MASS INDEX: 30.43 KG/M2 | HEART RATE: 80 BPM | WEIGHT: 155 LBS

## 2022-09-16 DIAGNOSIS — M47.816 SPONDYLOSIS W/OUT MYELOPATHY OR RADICULOPATHY, CERVICAL REGION: ICD-10-CM

## 2022-09-16 DIAGNOSIS — M47.812 SPONDYLOSIS W/OUT MYELOPATHY OR RADICULOPATHY, CERVICAL REGION: ICD-10-CM

## 2022-09-16 DIAGNOSIS — M50.30 OTHER CERVICAL DISC DEGENERATION, UNSPECIFIED CERVICAL REGION: ICD-10-CM

## 2022-09-16 PROCEDURE — 99214 OFFICE O/P EST MOD 30 MIN: CPT

## 2022-09-28 ENCOUNTER — APPOINTMENT (OUTPATIENT)
Dept: SURGICAL ONCOLOGY | Facility: CLINIC | Age: 52
End: 2022-09-28

## 2022-09-28 VITALS
SYSTOLIC BLOOD PRESSURE: 155 MMHG | HEART RATE: 80 BPM | DIASTOLIC BLOOD PRESSURE: 101 MMHG | HEIGHT: 60 IN | WEIGHT: 155 LBS | BODY MASS INDEX: 30.43 KG/M2

## 2022-09-28 VITALS — TEMPERATURE: 98.5 F

## 2022-09-28 PROCEDURE — 99213 OFFICE O/P EST LOW 20 MIN: CPT

## 2022-09-28 PROCEDURE — 99203 OFFICE O/P NEW LOW 30 MIN: CPT

## 2022-09-28 NOTE — CONSULT LETTER
[Dear  ___] : Dear ~FAVIO, [Consult Letter:] : I had the pleasure of evaluating your patient, [unfilled]. [( Thank you for referring [unfilled] for consultation for _____ )] : Thank you for referring [unfilled] for consultation for [unfilled] [Please see my note below.] : Please see my note below. [Consult Closing:] : Thank you very much for allowing me to participate in the care of this patient.  If you have any questions, please do not hesitate to contact me. [Sincerely,] : Sincerely, [FreeTextEntry2] : Phyllis Michelle, NP [FreeTextEntry3] : Lupillo Paige MD, FICS, FACS\par Director of Surgical Oncology- [facility] \par , Department of Surgery  \par [facility] [employer] [facility] [employer] [facility] \par [address]\[place] [address]\par \par [address]\[place] [address]\par \par [address]\[place] [address]\par \par (mob) [phone]\par (o) [phone]\par (f) [phone]\par

## 2022-09-28 NOTE — PHYSICAL EXAM
[Normal] : supple, no neck mass and thyroid not enlarged [Normal Neck Lymph Nodes] : normal neck lymph nodes  [Normal Supraclavicular Lymph Nodes] : normal supraclavicular lymph nodes [Normal Groin Lymph Nodes] : normal groin lymph nodes [Normal Axillary Lymph Nodes] : normal axillary lymph nodes [Normal] : oriented to person, place and time, with appropriate affect [FreeTextEntry1] : COVID-19 precautions as per [facility] policy was universally followed  [de-identified] : Complete breast exam performed in supine and upright positions.  No palpable masses, tenderness, nipple discharge, inversion, deviation or enlarged axillary or supraclavicular lymph nodes bilaterally.

## 2022-09-28 NOTE — HISTORY OF PRESENT ILLNESS
[de-identified] : Ms. LEON AVELAR is a 51 year old woman, referred by Phyllis Michelle NP, presents today for an initial breast consultation.\par \par Ms Avelar has no personal or family history of breast or ovarian cancer.\par B/L screening mammo/sono from 10/2021 showed focal asymmetry to left breast at the mid depth left lower inner quadrant, 7 cm fn. Incomplete findings, BI-RADS 0\par \par F/U left breast mammo/sono 10/2021 showed persistent small nodular asymmetry to inner lower left breast corresponding to 7 x 4 mm hypoechoic nodule at 7:00 7 cm fn, suggestive of complicated cyst, 6-month FU U/S, BI-RADS 3\par \par 6-month F/U left sono 4/2022 shows a cluster of cysts at 7:00 7 cm fn, measuring 8 x 3 x 7 mm, grossly stable, F/U in 6 months (which will be annual imaging), probably benign findings, BI-RADS 3

## 2022-09-28 NOTE — ASSESSMENT
[FreeTextEntry1] : IMP:\par \par 52yo F w/ stable left breast cyst \par \par F/U left breast mammo/sono 10/2021 showed persistent small nodular asymmetry to inner lower left breast corresponding to 7 x 4 mm hypoechoic nodule at 7:00 7 cm fn, suggestive of complicated cyst, 6-month FU U/S, BI-RADS 3\par \par 6-month F/U left sono 4/2022 shows a cluster of cysts at 7:00 7 cm fn, measuring 8 x 3 x 7 mm, grossly stable, F/U in 6 months (which will be annual imaging), probably benign findings, BI-RADS 3\par \par PLAN:\par B/L mammo/sono 10/2022\par RTO via telehealth after imaging to discuss findings

## 2022-10-09 ENCOUNTER — FORM ENCOUNTER (OUTPATIENT)
Age: 52
End: 2022-10-09

## 2022-11-14 PROBLEM — N60.02 CYST OF LEFT BREAST: Status: ACTIVE | Noted: 2022-09-26

## 2022-11-16 ENCOUNTER — APPOINTMENT (OUTPATIENT)
Dept: SURGICAL ONCOLOGY | Facility: CLINIC | Age: 52
End: 2022-11-16
Payer: COMMERCIAL

## 2022-11-16 DIAGNOSIS — N60.02 SOLITARY CYST OF LEFT BREAST: ICD-10-CM

## 2022-11-16 PROCEDURE — 99203 OFFICE O/P NEW LOW 30 MIN: CPT | Mod: 95

## 2022-11-16 PROCEDURE — 99213 OFFICE O/P EST LOW 20 MIN: CPT | Mod: 95

## 2022-11-29 NOTE — ASSESSMENT
[FreeTextEntry1] : IMP:\par 50yo F w/ stable left breast cyst \par \par F/U left breast mammo/sono 10/2021 showed persistent small nodular asymmetry to inner lower left breast corresponding to 7 x 4 mm hypoechoic nodule at 7:00 7 cm fn, suggestive of complicated cyst, 6-month FU U/S, BI-RADS 3\par \par 6-month F/U left sono 4/2022 shows a cluster of cysts at 7:00 7 cm fn, measuring 8 x 3 x 7 mm, grossly stable, F/U in 6 months (which will be annual imaging), probably benign findings, BI-RADS 3\par \par B/L mammo/sono 10/2022 with benign findings, BI-RADS 2\par \par PLAN:\par RTO PRN \par B/L mammo/sono 10/2023\par Continue folow-up with PCP/GYN

## 2022-11-29 NOTE — PHYSICAL EXAM
[FreeTextEntry1] : Physical exam not performed during telehealth visits  [Normal] : supple, no neck mass and thyroid not enlarged [Normal Neck Lymph Nodes] : normal neck lymph nodes  [Normal Supraclavicular Lymph Nodes] : normal supraclavicular lymph nodes [Normal Groin Lymph Nodes] : normal groin lymph nodes [Normal Axillary Lymph Nodes] : normal axillary lymph nodes [Normal] : oriented to person, place and time, with appropriate affect [de-identified] : .breast

## 2022-11-29 NOTE — HISTORY OF PRESENT ILLNESS
[de-identified] : This visit was provided via telehealth using real-time 2-way audio visual technology. The patient, LEON AVELAR, was located at home [address] [place] [address] at the time of the visit. This provider was located at the clinic in [place], [place] at the time of the visit. The provider, patient participated in the telehealth encounter.  Verbal consent was given Nov 16 2022  2:45PM by the patient. \par \par Ms. LEON AVELAR is a 51 year old woman who presents for a follow-up visit \par \par referred by Phyllis Michelle NP\par \par Ms Avelar has no personal or family history of breast or ovarian cancer.\par B/L screening mammo/sono from 10/2021 showed focal asymmetry to left breast at the mid depth left lower inner quadrant, 7 cm fn. Incomplete findings, BI-RADS 0\par \par F/U left breast mammo/sono 10/2021 showed persistent small nodular asymmetry to inner lower left breast corresponding to 7 x 4 mm hypoechoic nodule at 7:00 7 cm fn, suggestive of complicated cyst, 6-month FU U/S, BI-RADS 3\par \par 6-month F/U left sono 4/2022 shows a cluster of cysts at 7:00 7 cm fn, measuring 8 x 3 x 7 mm, grossly stable, F/U in 6 months (which will be annual imaging), probably benign findings, BI-RADS 3\par \par B/L mammo/sono 10/2022 with benign findings, BI-RADS 2

## 2022-11-29 NOTE — CONSULT LETTER
[FreeTextEntry2] : Phyllis Michelle, NP [FreeTextEntry3] : Lupillo Paige MD, FICS, FACS\par Director of Surgical Oncology- [facility] \par , Department of Surgery  \par [facility] [employer] [facility] [employer] [facility] at [facility] \par [address]\[place] [address]\par \par [address]\[place] [address]\par \par [address]\[place] [address]\par \par (mob) [phone]\par (o) [phone]\par (f) [phone]\par

## 2023-06-02 ENCOUNTER — EMERGENCY (EMERGENCY)
Facility: HOSPITAL | Age: 53
LOS: 1 days | Discharge: HOME | End: 2023-06-02
Admitting: EMERGENCY MEDICINE
Payer: COMMERCIAL

## 2023-06-02 VITALS
TEMPERATURE: 98 F | RESPIRATION RATE: 19 BRPM | HEART RATE: 91 BPM | OXYGEN SATURATION: 100 % | DIASTOLIC BLOOD PRESSURE: 82 MMHG | SYSTOLIC BLOOD PRESSURE: 170 MMHG

## 2023-06-02 VITALS
OXYGEN SATURATION: 100 % | HEART RATE: 98 BPM | RESPIRATION RATE: 18 BRPM | SYSTOLIC BLOOD PRESSURE: 142 MMHG | DIASTOLIC BLOOD PRESSURE: 85 MMHG

## 2023-06-02 LAB
ALBUMIN SERPL ELPH-MCNC: 4.4 G/DL — SIGNIFICANT CHANGE UP (ref 3.3–5)
ALP SERPL-CCNC: 104 U/L — SIGNIFICANT CHANGE UP (ref 40–120)
ALT FLD-CCNC: 12 U/L — SIGNIFICANT CHANGE UP (ref 4–33)
ANION GAP SERPL CALC-SCNC: 15 MMOL/L — HIGH (ref 7–14)
APPEARANCE UR: CLEAR — SIGNIFICANT CHANGE UP
AST SERPL-CCNC: 18 U/L — SIGNIFICANT CHANGE UP (ref 4–32)
BACTERIA # UR AUTO: NEGATIVE — SIGNIFICANT CHANGE UP
BASOPHILS # BLD AUTO: 0.03 K/UL — SIGNIFICANT CHANGE UP (ref 0–0.2)
BASOPHILS NFR BLD AUTO: 0.3 % — SIGNIFICANT CHANGE UP (ref 0–2)
BILIRUB SERPL-MCNC: 0.3 MG/DL — SIGNIFICANT CHANGE UP (ref 0.2–1.2)
BILIRUB UR-MCNC: NEGATIVE — SIGNIFICANT CHANGE UP
BUN SERPL-MCNC: 19 MG/DL — SIGNIFICANT CHANGE UP (ref 7–23)
CALCIUM SERPL-MCNC: 9.2 MG/DL — SIGNIFICANT CHANGE UP (ref 8.4–10.5)
CHLORIDE SERPL-SCNC: 102 MMOL/L — SIGNIFICANT CHANGE UP (ref 98–107)
CO2 SERPL-SCNC: 22 MMOL/L — SIGNIFICANT CHANGE UP (ref 22–31)
COLOR SPEC: SIGNIFICANT CHANGE UP
CREAT SERPL-MCNC: 0.99 MG/DL — SIGNIFICANT CHANGE UP (ref 0.5–1.3)
DIFF PNL FLD: ABNORMAL
EGFR: 68 ML/MIN/1.73M2 — SIGNIFICANT CHANGE UP
EOSINOPHIL # BLD AUTO: 0.01 K/UL — SIGNIFICANT CHANGE UP (ref 0–0.5)
EOSINOPHIL NFR BLD AUTO: 0.1 % — SIGNIFICANT CHANGE UP (ref 0–6)
EPI CELLS # UR: 4 /HPF — SIGNIFICANT CHANGE UP (ref 0–5)
GLUCOSE SERPL-MCNC: 155 MG/DL — HIGH (ref 70–99)
GLUCOSE UR QL: NEGATIVE — SIGNIFICANT CHANGE UP
HCT VFR BLD CALC: 39 % — SIGNIFICANT CHANGE UP (ref 34.5–45)
HGB BLD-MCNC: 12.5 G/DL — SIGNIFICANT CHANGE UP (ref 11.5–15.5)
HYALINE CASTS # UR AUTO: 0 /LPF — SIGNIFICANT CHANGE UP (ref 0–7)
IANC: 9.14 K/UL — HIGH (ref 1.8–7.4)
IMM GRANULOCYTES NFR BLD AUTO: 0.5 % — SIGNIFICANT CHANGE UP (ref 0–0.9)
KETONES UR-MCNC: ABNORMAL
LEUKOCYTE ESTERASE UR-ACNC: ABNORMAL
LYMPHOCYTES # BLD AUTO: 1.33 K/UL — SIGNIFICANT CHANGE UP (ref 1–3.3)
LYMPHOCYTES # BLD AUTO: 12.2 % — LOW (ref 13–44)
MCHC RBC-ENTMCNC: 28.5 PG — SIGNIFICANT CHANGE UP (ref 27–34)
MCHC RBC-ENTMCNC: 32.1 GM/DL — SIGNIFICANT CHANGE UP (ref 32–36)
MCV RBC AUTO: 88.8 FL — SIGNIFICANT CHANGE UP (ref 80–100)
MONOCYTES # BLD AUTO: 0.3 K/UL — SIGNIFICANT CHANGE UP (ref 0–0.9)
MONOCYTES NFR BLD AUTO: 2.8 % — SIGNIFICANT CHANGE UP (ref 2–14)
NEUTROPHILS # BLD AUTO: 9.14 K/UL — HIGH (ref 1.8–7.4)
NEUTROPHILS NFR BLD AUTO: 84.1 % — HIGH (ref 43–77)
NITRITE UR-MCNC: NEGATIVE — SIGNIFICANT CHANGE UP
NRBC # BLD: 0 /100 WBCS — SIGNIFICANT CHANGE UP (ref 0–0)
NRBC # FLD: 0 K/UL — SIGNIFICANT CHANGE UP (ref 0–0)
PH UR: 6.5 — SIGNIFICANT CHANGE UP (ref 5–8)
PLATELET # BLD AUTO: 284 K/UL — SIGNIFICANT CHANGE UP (ref 150–400)
POTASSIUM SERPL-MCNC: 4.4 MMOL/L — SIGNIFICANT CHANGE UP (ref 3.5–5.3)
POTASSIUM SERPL-SCNC: 4.4 MMOL/L — SIGNIFICANT CHANGE UP (ref 3.5–5.3)
PROT SERPL-MCNC: 7.9 G/DL — SIGNIFICANT CHANGE UP (ref 6–8.3)
PROT UR-MCNC: ABNORMAL
RBC # BLD: 4.39 M/UL — SIGNIFICANT CHANGE UP (ref 3.8–5.2)
RBC # FLD: 13.9 % — SIGNIFICANT CHANGE UP (ref 10.3–14.5)
RBC CASTS # UR COMP ASSIST: 61 /HPF — HIGH (ref 0–4)
SODIUM SERPL-SCNC: 139 MMOL/L — SIGNIFICANT CHANGE UP (ref 135–145)
SP GR SPEC: 1.02 — SIGNIFICANT CHANGE UP (ref 1.01–1.05)
UROBILINOGEN FLD QL: SIGNIFICANT CHANGE UP
WBC # BLD: 10.86 K/UL — HIGH (ref 3.8–10.5)
WBC # FLD AUTO: 10.86 K/UL — HIGH (ref 3.8–10.5)
WBC UR QL: 3 /HPF — SIGNIFICANT CHANGE UP (ref 0–5)

## 2023-06-02 PROCEDURE — 99284 EMERGENCY DEPT VISIT MOD MDM: CPT

## 2023-06-02 PROCEDURE — 74176 CT ABD & PELVIS W/O CONTRAST: CPT | Mod: 26,GC,MA

## 2023-06-02 RX ORDER — ONDANSETRON 8 MG/1
4 TABLET, FILM COATED ORAL ONCE
Refills: 0 | Status: COMPLETED | OUTPATIENT
Start: 2023-06-02 | End: 2023-06-02

## 2023-06-02 RX ORDER — TAMSULOSIN HYDROCHLORIDE 0.4 MG/1
0.4 CAPSULE ORAL ONCE
Refills: 0 | Status: COMPLETED | OUTPATIENT
Start: 2023-06-02 | End: 2023-06-02

## 2023-06-02 RX ORDER — KETOROLAC TROMETHAMINE 30 MG/ML
15 SYRINGE (ML) INJECTION ONCE
Refills: 0 | Status: DISCONTINUED | OUTPATIENT
Start: 2023-06-02 | End: 2023-06-02

## 2023-06-02 RX ORDER — SODIUM CHLORIDE 9 MG/ML
1000 INJECTION INTRAMUSCULAR; INTRAVENOUS; SUBCUTANEOUS ONCE
Refills: 0 | Status: COMPLETED | OUTPATIENT
Start: 2023-06-02 | End: 2023-06-02

## 2023-06-02 RX ADMIN — Medication 15 MILLIGRAM(S): at 22:41

## 2023-06-02 RX ADMIN — ONDANSETRON 4 MILLIGRAM(S): 8 TABLET, FILM COATED ORAL at 22:59

## 2023-06-02 RX ADMIN — Medication 15 MILLIGRAM(S): at 20:29

## 2023-06-02 RX ADMIN — TAMSULOSIN HYDROCHLORIDE 0.4 MILLIGRAM(S): 0.4 CAPSULE ORAL at 23:19

## 2023-06-02 RX ADMIN — SODIUM CHLORIDE 1000 MILLILITER(S): 9 INJECTION INTRAMUSCULAR; INTRAVENOUS; SUBCUTANEOUS at 21:21

## 2023-06-02 NOTE — ED PROVIDER NOTE - CLINICAL SUMMARY MEDICAL DECISION MAKING FREE TEXT BOX
52-year-old female with history of borderline diabetes, hypertension, high cholesterol presents complaining of sudden onset of left lower quadrant abdominal pain radiating to her left flank that began around 2 PM with associated nausea and vomiting.  Denies associated fever, chills, diarrhea, dysuria, hematuria, urinary frequency.  Patient states pain feels different than when she had kidney stones a few years ago.  No other voiced complaints.  Vitals- hypertensive. Exam as noted. Will obtain labs and CT to r/o ureteral stone, low suspicion for divertic/colitis given H&P.

## 2023-06-02 NOTE — ED ADULT NURSE NOTE - NSFALLUNIVINTERV_ED_ALL_ED
Bed/Stretcher in lowest position, wheels locked, appropriate side rails in place/Call bell, personal items and telephone in reach/Instruct patient to call for assistance before getting out of bed/chair/stretcher/Non-slip footwear applied when patient is off stretcher/Deport to call system/Physically safe environment - no spills, clutter or unnecessary equipment/Purposeful proactive rounding/Room/bathroom lighting operational, light cord in reach

## 2023-06-02 NOTE — ED PROVIDER NOTE - PHYSICAL EXAMINATION
CONSTITUTIONAL: Well-appearing; well-nourished; in distress 2/2 pain. Non-toxic appearing.   NEURO: Alert & oriented. Gait steady without assistance. Sensory and motor functions are grossly intact.  PSYCH: Mood appropriate. Thought processes intact.   NECK: Supple  CARD: Regular rate and rhythm, no murmurs  RESP: No accessory muscle use; breath sounds clear and equal bilaterally; no wheezes, rhonchi, or rales     ABD: Soft; non-distended; non-tender. No guarding or rebound.  : No CVA tenderness b/l.   MUSCULOSKELETAL/EXTREMITIES: FROM in all four extremities; no extremity edema.  SKIN: Warm; dry; no apparent lesions or exudate

## 2023-06-02 NOTE — ED PROVIDER NOTE - CARE PROVIDER_API CALL
Troy Leija.  Urology  [address] [place] [address], [address]  [place] [address]  Phone: [phone]  Fax: [phone]  Established Patient  Follow Up Time: 7-10 Days

## 2023-06-02 NOTE — ED PROVIDER NOTE - NS ED ROS FT
ROS:  GENERAL: As per HPI   CARDIAC: no chest pain  PULMONARY: no cough, no shortness of breath  GI: As per HPI   : As per HPI   SKIN: no rashes  NEURO: no headache, no weakness, no dizziness  MSK: No joint pain  All other systems reviewed as negative.

## 2023-06-02 NOTE — ED PROVIDER NOTE - OBJECTIVE STATEMENT
52-year-old female with history of borderline diabetes, hypertension, high cholesterol presents complaining of sudden onset of left lower quadrant abdominal pain radiating to her left flank that began around 2 PM with associated nausea and vomiting.  Denies associated fever, chills, diarrhea, dysuria, hematuria, urinary frequency.  Patient states pain feels different than when she had kidney stones a few years ago.  No other voiced complaints.

## 2023-06-02 NOTE — ED ADULT TRIAGE NOTE - CHIEF COMPLAINT QUOTE
Pt c/o LLQ pain that radiates to left flank area that started at 1400pm today. Also endorsing hematuria. Hx of HTN, high cholesterol, borderline DM, kidney stones. fingerstick 138.

## 2023-06-02 NOTE — ED PROVIDER NOTE - NSFOLLOWUPINSTRUCTIONS_ED_ALL_ED_FT
Monitor. Kidney Stones    Kidney stones (urolithiasis) are crystal deposits that form inside your kidneys. Pain is caused by the stone moving through the urinary tract, causing spasms of the ureter. Drink enough water and fluids to keep your urine clear or pale yellow. This will help you to pass the stone or stone fragments. If provided a strainer, strain all urine and keep all particulate matter and stones for a follow up appointment with a urologist.    SEEK IMMEDIATE MEDICAL CARE IF YOU HAVE ANY OF THE FOLLOWING SYMPTOMS: pain not controlled with medication, fever/chills, worsening vomiting, inability to urinate, or dizziness/lightheadedness.

## 2023-06-02 NOTE — ED ADULT NURSE NOTE - OBJECTIVE STATEMENT
Pt received to intake room 7 a/o x 3 c/o sharp left flank pain LLQ abd pain, nausea vomiting since 2pm today. Pt denies any urinary symptoms. LBM was this morning and normal for her. ABD is soft, tender, non distended with normal active bowel sounds. No complaints of chest pain, headache, nausea, dizziness, vomiting  SOB, fever, chills verbalized. 20g iv placed to left AC. labs drawn and sent. medication given as per order.

## 2023-06-02 NOTE — ED PROVIDER NOTE - PATIENT PORTAL LINK FT
You can access the FollowMyHealth Patient Portal offered by [facility] by registering at the following website: [email or web address]. By joining [facility]’s FollowMyHealth portal, you will also be able to view your health information using other applications (apps) compatible with our system.

## 2023-06-03 RX ORDER — OXYCODONE AND ACETAMINOPHEN 5; 325 MG/1; MG/1
1 TABLET ORAL
Qty: 12 | Refills: 0
Start: 2023-06-03 | End: 2023-06-05

## 2023-06-03 RX ORDER — OXYCODONE AND ACETAMINOPHEN 5; 325 MG/1; MG/1
1 TABLET ORAL
Qty: 9 | Refills: 0
Start: 2023-06-03 | End: 2023-06-05

## 2023-06-03 RX ORDER — IBUPROFEN 200 MG
1 TABLET ORAL
Qty: 28 | Refills: 0
Start: 2023-06-03 | End: 2023-06-09

## 2023-06-03 RX ORDER — TAMSULOSIN HYDROCHLORIDE 0.4 MG/1
1 CAPSULE ORAL
Qty: 7 | Refills: 0
Start: 2023-06-03 | End: 2023-06-09

## 2023-06-03 NOTE — POST DISCHARGE NOTE - OTHER COMMUNICATION DETAILS:
Pt called on 06/03/23 @ 10:47 AM, states that the [facility] does not carry percocet so would like the pharmacy changed to [facility] [address] [place] [address]. I called [facility] and spoke to April, who informed me that they do not carry percocet, so I informed her to cancel the order. [facility]. She is aware that the change has been made.

## 2023-06-03 NOTE — POST DISCHARGE NOTE - NOTIFICATION:
Pt called on 06/03/23 @ 10:47 AM, states that the [facility] does not carry percocet so would like the pharmacy changed Pt called on 06/03/23 @ 10:47 AM, states that the [facility] does not carry percocet so would like the pharmacy changed    1115am Sent prescription to [facility]> Dr Snell

## 2023-06-04 LAB
CULTURE RESULTS: SIGNIFICANT CHANGE UP
SPECIMEN SOURCE: SIGNIFICANT CHANGE UP

## 2023-06-05 ENCOUNTER — APPOINTMENT (OUTPATIENT)
Dept: UROLOGY | Facility: CLINIC | Age: 53
End: 2023-06-05
Payer: COMMERCIAL

## 2023-06-05 VITALS
RESPIRATION RATE: 16 BRPM | DIASTOLIC BLOOD PRESSURE: 85 MMHG | SYSTOLIC BLOOD PRESSURE: 140 MMHG | HEART RATE: 64 BPM | TEMPERATURE: 97.2 F

## 2023-06-05 DIAGNOSIS — N13.30 UNSPECIFIED HYDRONEPHROSIS: ICD-10-CM

## 2023-06-05 PROBLEM — E78.5 HYPERLIPIDEMIA, UNSPECIFIED: Chronic | Status: ACTIVE | Noted: 2023-06-02

## 2023-06-05 PROCEDURE — 99214 OFFICE O/P EST MOD 30 MIN: CPT

## 2023-06-05 RX ORDER — TAMSULOSIN HYDROCHLORIDE 0.4 MG/1
0.4 CAPSULE ORAL
Qty: 30 | Refills: 1 | Status: ACTIVE | COMMUNITY
Start: 2023-06-05 | End: 1900-01-01

## 2023-06-08 PROBLEM — N13.30 HYDRONEPHROSIS: Status: ACTIVE | Noted: 2023-06-08

## 2023-06-08 LAB — BACTERIA UR CULT: NORMAL

## 2023-06-08 NOTE — ASSESSMENT
[Hydronephrosis (591\N13.30)] : Salter-Menard type I [Ureteral Stone (592.1\N20.1)] : position [FreeTextEntry1] : Options discussed\par MET vs. URS\par \par Stone is small enough for trial of spontaneous passage.  \par Trial of Medical expulsive therapy. \par Continue Flomax 0.4mg po qhs. \par OTC Pain medications as needed. \par Increase fluids intake. \par Reassess in 2-3 weeks with follow up CT stone hunt to assess for stone passage.  \par Left Ureteroscopy laser lithotripsy stent insertion if stone does not pass or if symptoms become intractable.\par

## 2023-06-08 NOTE — HISTORY OF PRESENT ILLNESS
[FreeTextEntry1] : 53 y/o woman\par prior hx of stone\par Recent ED visit--diagnosed with 5mm left proximal ureteral stone\par pain has been managed with advil and tylenol\par currently no pain\par no fevers\par \par PVR- 0 ml \par \par CT- left ureteral 5 mm stone, left kidney punctate stone

## 2023-06-26 ENCOUNTER — OUTPATIENT (OUTPATIENT)
Dept: OUTPATIENT SERVICES | Facility: HOSPITAL | Age: 53
LOS: 1 days | End: 2023-06-26
Payer: COMMERCIAL

## 2023-06-26 ENCOUNTER — APPOINTMENT (OUTPATIENT)
Dept: CT IMAGING | Facility: IMAGING CENTER | Age: 53
End: 2023-06-26
Payer: COMMERCIAL

## 2023-06-26 DIAGNOSIS — N20.0 CALCULUS OF KIDNEY: ICD-10-CM

## 2023-06-26 PROCEDURE — 74176 CT ABD & PELVIS W/O CONTRAST: CPT

## 2023-06-26 PROCEDURE — 74176 CT ABD & PELVIS W/O CONTRAST: CPT | Mod: 26

## 2023-07-05 ENCOUNTER — APPOINTMENT (OUTPATIENT)
Dept: UROLOGY | Facility: CLINIC | Age: 53
End: 2023-07-05
Payer: COMMERCIAL

## 2023-07-05 ENCOUNTER — OUTPATIENT (OUTPATIENT)
Dept: OUTPATIENT SERVICES | Facility: HOSPITAL | Age: 53
LOS: 1 days | End: 2023-07-05
Payer: COMMERCIAL

## 2023-07-05 DIAGNOSIS — N23 UNSPECIFIED RENAL COLIC: ICD-10-CM

## 2023-07-05 PROCEDURE — 99214 OFFICE O/P EST MOD 30 MIN: CPT

## 2023-07-05 PROCEDURE — 76775 US EXAM ABDO BACK WALL LIM: CPT

## 2023-07-05 PROCEDURE — 76775 US EXAM ABDO BACK WALL LIM: CPT | Mod: 26

## 2023-07-05 NOTE — ASSESSMENT
[FreeTextEntry1] : 52 year old woman with history of left ureteral stone on MET . Recent CT showed migration of stone. US today confirmed that the stone is still in the UVJ with no ureteral jet visualized. \par \par She is travelling to [place] at the end of July\par \par Advised URS to remove stone given her impending trip, but she prefers to continue medical expulsive therapy\par I will be away in 2 weeks\par Will try to have her follow up with Dr. Arthur or Hoenig

## 2023-07-05 NOTE — PHYSICAL EXAM
[General Appearance - Well Developed] : well developed [General Appearance - Well Nourished] : well nourished [Skin Color & Pigmentation] : normal skin color and pigmentation [] : no respiratory distress [Oriented To Time, Place, And Person] : oriented to person, place, and time [Normal Station and Gait] : the gait and station were normal for the patient's age [No Focal Deficits] : no focal deficits

## 2023-07-05 NOTE — HISTORY OF PRESENT ILLNESS
[FreeTextEntry1] : 51 y/o woman\par prior hx of stone\par Recent ED visit--diagnosed with 5mm left proximal ureteral stone, CT this visit showed the resolution of hydronephrosis and passage of the stone in the bladder \par  Has remained asymptomatic since last visit on medical expulsive therapy. \par \par \par Review of images from CT suggest that stone is still at UVJ (Pt is prone and stone remains at UVJ)\par Bladder ultrasound today in office: stone is at UVJ

## 2023-07-07 DIAGNOSIS — N23 UNSPECIFIED RENAL COLIC: ICD-10-CM

## 2023-07-07 DIAGNOSIS — N20.1 CALCULUS OF URETER: ICD-10-CM

## 2023-07-07 DIAGNOSIS — N20.0 CALCULUS OF KIDNEY: ICD-10-CM

## 2023-07-12 ENCOUNTER — APPOINTMENT (OUTPATIENT)
Dept: UROLOGY | Facility: CLINIC | Age: 53
End: 2023-07-12

## 2023-07-19 ENCOUNTER — APPOINTMENT (OUTPATIENT)
Dept: UROLOGY | Facility: CLINIC | Age: 53
End: 2023-07-19
Payer: COMMERCIAL

## 2023-07-19 ENCOUNTER — OUTPATIENT (OUTPATIENT)
Dept: OUTPATIENT SERVICES | Facility: HOSPITAL | Age: 53
LOS: 1 days | End: 2023-07-19
Payer: COMMERCIAL

## 2023-07-19 DIAGNOSIS — R35.0 FREQUENCY OF MICTURITION: ICD-10-CM

## 2023-07-19 DIAGNOSIS — N20.0 CALCULUS OF KIDNEY: ICD-10-CM

## 2023-07-19 DIAGNOSIS — N20.1 CALCULUS OF URETER: ICD-10-CM

## 2023-07-19 PROCEDURE — 76775 US EXAM ABDO BACK WALL LIM: CPT

## 2023-07-19 PROCEDURE — 76775 US EXAM ABDO BACK WALL LIM: CPT | Mod: 26

## 2023-07-19 PROCEDURE — 99214 OFFICE O/P EST MOD 30 MIN: CPT | Mod: 25

## 2023-07-19 NOTE — ASSESSMENT
[FreeTextEntry1] : Renal US reviewed: no stone in kidneys, no hydro, no solid renal mass, left UVJ stone still visible without change from CT (comparison)- shadowing stone still seen left UVJ. \par \par Compared to prior films; appears still present stone at left UVJ\par Rec left URS with laser/stone removal, possible balloon dilation, possible stent\par \par Pt in agreement after discussion risks/benefits- will sched with either me or Bev, but has been in place 6 weeks to this point.\par \par All risks/benefits reviewed, including risks of infection, problems related to URS requiring other procedures... but also of long term risks of non-passage of stone.

## 2023-07-19 NOTE — PHYSICAL EXAM
[General Appearance - Well Developed] : well developed [Skin Color & Pigmentation] : normal skin color and pigmentation [Edema] : no peripheral edema [] : no respiratory distress [Oriented To Time, Place, And Person] : oriented to person, place, and time [No Focal Deficits] : no focal deficits [Abdomen Soft] : soft [Abdomen Tenderness] : non-tender [Costovertebral Angle Tenderness] : no ~M costovertebral angle tenderness [Urinary Bladder Findings] : the bladder was normal on palpation [Normal Station and Gait] : the gait and station were normal for the patient's age

## 2023-07-19 NOTE — HISTORY OF PRESENT ILLNESS
[FreeTextEntry1] : 51 y/o woman presents to follow up with kidney stones. Pt was in the ER in June 2023- diagnosed with 5mm left proximal ureteral stone, CT this visit showed the resolution of hydronephrosis and passage of the stone in the bladder. Has remained asymptomatic since last visit on medical expulsive therapy. \par \par Review of images from CT 6/26/23 suggest that stone is still at UVJ (Pt is prone and stone remains at UVJ)\par \par For renal/bladder US today, discussion of surgical intervention if stone still present.\par  [None] : no symptoms

## 2023-07-20 DIAGNOSIS — N20.1 CALCULUS OF URETER: ICD-10-CM

## 2023-07-20 DIAGNOSIS — N20.0 CALCULUS OF KIDNEY: ICD-10-CM

## 2023-07-22 LAB — BACTERIA UR CULT: NORMAL

## 2023-10-02 ENCOUNTER — APPOINTMENT (OUTPATIENT)
Dept: UROLOGY | Facility: CLINIC | Age: 53
End: 2023-10-02